# Patient Record
(demographics unavailable — no encounter records)

---

## 2024-10-11 NOTE — DISCUSSION/SUMMARY
[FreeTextEntry1] :  7 yo female here for WCC.   BMI   >95%tile  - Maintain balanced diet with all food groups. Limit sugary beverages & Junk food - Reviewed 5-2-1-0  - Encouraged increase of physical activity  WCC - Developmentally appropriate for age - Continue balanced diet with all food groups. - Brush teeth twice a day with toothbrush. Recommend visit to dentist at least yearly. - Help child to maintain consistent daily routines and sleep schedule.  - School discussed. Ensure home is safe. Teach child about personal safety. Use consistent, positive discipline.  - Limit screen time to no more than 2 hours per day.  - Encourage physical activity.  - Vaccines : Flu...counseled on COVID, MOC to return - Return 1 year for routine well child check.

## 2024-10-11 NOTE — DISCUSSION/SUMMARY
[FreeTextEntry1] :  5 yo female here for WCC.   BMI   >95%tile  - Maintain balanced diet with all food groups. Limit sugary beverages & Junk food - Reviewed 5-2-1-0  - Encouraged increase of physical activity  WCC - Developmentally appropriate for age - Continue balanced diet with all food groups. - Brush teeth twice a day with toothbrush. Recommend visit to dentist at least yearly. - Help child to maintain consistent daily routines and sleep schedule.  - School discussed. Ensure home is safe. Teach child about personal safety. Use consistent, positive discipline.  - Limit screen time to no more than 2 hours per day.  - Encourage physical activity.  - Vaccines : Flu...counseled on COVID, MOC to return - Return 1 year for routine well child check.

## 2024-10-11 NOTE — HISTORY OF PRESENT ILLNESS
[Mother] : mother [Grade ___] : Grade [unfilled] [Normal] : Normal [Brushing teeth] : Brushing teeth [Playtime (60 min/d)] : Playtime 60 min a day [Appropiate parent-child-sibling interaction] : Appropriate parent-child-sibling interaction [Car seat in back seat] : Car seat in back seat [de-identified] : Eating well.  [FreeTextEntry9] : Likes playing basketball. on tablet.  [de-identified] : [id number] - doing well, wants to be doctor.  [FreeTextEntry1] : Abdominal pain has improved. Using ex lax intermittently, helps with BM ~4 hours post medication.   Headaches improved. No longer as frequent.

## 2024-10-11 NOTE — HISTORY OF PRESENT ILLNESS
[Mother] : mother [Grade ___] : Grade [unfilled] [Normal] : Normal [Brushing teeth] : Brushing teeth [Playtime (60 min/d)] : Playtime 60 min a day [Appropiate parent-child-sibling interaction] : Appropriate parent-child-sibling interaction [Car seat in back seat] : Car seat in back seat [de-identified] : Eating well.  [FreeTextEntry9] : Likes playing basketball. on tablet.  [de-identified] : [id number] - doing well, wants to be doctor.  [FreeTextEntry1] : Abdominal pain has improved. Using ex lax intermittently, helps with BM ~4 hours post medication.   Headaches improved. No longer as frequent.

## 2024-12-24 NOTE — HISTORY OF PRESENT ILLNESS
[de-identified] : Hospital follow-up for ear infection [FreeTextEntry6] : 6 yr old F presenting for ear re-check. Diagnosed with L AOM on 12/14. First AOM.  She has been off of antibiotics for 2 days. No new fevers, no ear pain. Good appetite.

## 2024-12-24 NOTE — PHYSICAL EXAM
[Clear] : right tympanic membrane clear [NL] : warm, clear [Tired appearing] : not tired appearing [Lethargic] : not lethargic [Erythema] : no erythema [Bulging] : not bulging [Clear Effusion] : no clear effusion [Wheezing] : no wheezing [Crackles] : no crackles [Transmitted Upper Airway Sounds] : no transmitted upper airway sounds [Tachypnea] : no tachypnea [Rhonchi] : no rhonchi [Belly Breathing] : no belly breathing

## 2024-12-24 NOTE — HISTORY OF PRESENT ILLNESS
[de-identified] : Hospital follow-up for ear infection [FreeTextEntry6] : 6 yr old F presenting for ear re-check. Diagnosed with L AOM on 12/14. First AOM.  She has been off of antibiotics for 2 days. No new fevers, no ear pain. Good appetite.

## 2024-12-24 NOTE — DISCUSSION/SUMMARY
[FreeTextEntry1] : 6 yr old F with resolving L AOM. Asymptomatic.  Plan: - Encourage PO - Return as needed if symptoms return

## 2025-01-06 NOTE — HISTORY OF PRESENT ILLNESS
[No Personal or Family History of Easy Bruising, Bleeding, or Issues with General Anesthesia] : No Personal or Family History of easy bruising, bleeding, or issues with general anesthesia [No change in the review of systems as noted in prior visit date ___] : No change in the review of systems as noted in prior visit date of [unfilled] [de-identified] : 5 y/o F presents for b/l clogged ears and ear infection with chronic nasal congestion Using nasal steroid spray PRN for six months  1 recent ear infections No otalgia No otorrhea No hearing loss No speech concerns Nasal congestion Using flonase PRN with relief Snoring no pausing gasping or choking No recent fevers or infections.

## 2025-01-06 NOTE — PHYSICAL EXAM
[Complete] : complete cerumen impaction [Mild] : mild left inferior turbinate hypertrophy [2+] : 2+ [Increased Work of Breathing] : no increased work of breathing with use of accessory muscles and retractions [Normal muscle strength, symmetry and tone of facial, head and neck musculature] : normal muscle strength, symmetry and tone of facial, head and neck musculature [Normal] : no cervical lymphadenopathy

## 2025-01-06 NOTE — REASON FOR VISIT
[Subsequent Evaluation] : a subsequent evaluation for [Ear Infections] : ear infections [Nasal Obstruction] : nasal obstruction [FreeTextEntry2] : cerumen impaction [Mother] : mother

## 2025-03-05 NOTE — PHYSICAL EXAM
[Tired appearing] : tired appearing [Erythematous Oropharynx] : erythematous oropharynx [NL] : warm, clear [Lethargic] : not lethargic [Toxic] : not toxic [FreeTextEntry1] : TIRED, SLEEPING BUT AROUSABLE AND CONVERSANT [FreeTextEntry2] : NO SINUS TENDERNESS [de-identified] : NEGATIVE KERNIG, NEGATIVE BRUDZINSKI

## 2025-03-05 NOTE — PHYSICAL EXAM
[Tired appearing] : tired appearing [Erythematous Oropharynx] : erythematous oropharynx [NL] : warm, clear [Lethargic] : not lethargic [Toxic] : not toxic [FreeTextEntry1] : TIRED, SLEEPING BUT AROUSABLE AND CONVERSANT [FreeTextEntry2] : NO SINUS TENDERNESS [de-identified] : NEGATIVE KERNIG, NEGATIVE BRUDZINSKI

## 2025-04-23 NOTE — ASSESSMENT
[FreeTextEntry1] : Increasing frequency and severity of migraine-like headaches (now at least 2-3 times a week) and associated with prominent nausea and vomiting PLAN Will do brain MRI to rule out intracranial structural/vascular pathology because the recent change in frequency and severity of the headaches Abortive headache Tx: Ibuprofen 300 mg q 8 hrs prn (take with food prevent gastric irritation) Side effects and potential to cause rebound headaches with frequent use (> twice a week) discussed with parent Keep HA diary to document  frequency, identify triggers and response to medication Behavioral measures to avoid headaches (maintaining regular eating and sleeping habits, avoiding known triggers) discussed with parent and patient Can try OTC Magnesium + Riboflavin supplements daily if behavioral modification does not decrease headaches Will see back in 1-2 month to decide on need for prophylaxis and assess effectiveness of abortive Tx Refer to ophthalmology

## 2025-04-23 NOTE — HISTORY OF PRESENT ILLNESS
[FreeTextEntry1] : 6 year old F with migraine headaches. Last visit in 2022 Over the past few months, Graciela has been having increasing headaches. Recently, headaches have been happening several times a week with school alerting mom that she has to rest and put her head down.  Sent home from school a few times when headaches asso with vomiting - frontal, sharp, lasting several hours, associated with photophobia and phonophobia, + nausea +/-vomiting - relieved by rest and Motrin 300 mg Denies skipping meals Gets at least 9-10 hours of sleep a night

## 2025-04-23 NOTE — PHYSICAL EXAM
[Well-appearing] : well-appearing [Normocephalic] : normocephalic [No dysmorphic facial features] : no dysmorphic facial features [No ocular abnormalities] : no ocular abnormalities [Neck supple] : neck supple [Alert] : alert [Well related, good eye contact] : well related, good eye contact [Conversant] : conversant [Normal speech and language] : normal speech and language [Follows instructions well] : follows instructions well [VFF] : VFF [Pupils reactive to light and accommodation] : pupils reactive to light and accommodation [Full extraocular movements] : full extraocular movements [No nystagmus] : no nystagmus [No papilledema] : no papilledema [Normal facial sensation to light touch] : normal facial sensation to light touch [No facial asymmetry or weakness] : no facial asymmetry or weakness [Gross hearing intact] : gross hearing intact [Equal palate elevation] : equal palate elevation [Good shoulder shrug] : good shoulder shrug [Normal tongue movement] : normal tongue movement [R handed] : R handed [Normal axial and appendicular muscle tone] : normal axial and appendicular muscle tone [Gets up on table without difficulty] : gets up on table without difficulty [No pronator drift] : no pronator drift [Normal finger tapping and fine finger movements] : normal finger tapping and fine finger movements [No abnormal involuntary movements] : no abnormal involuntary movements [5/5 strength in proximal and distal muscles of arms and legs] : 5/5 strength in proximal and distal muscles of arms and legs [Walks and runs well] : walks and runs well [Able to do deep knee bend] : able to do deep knee bend [Able to walk on heels] : able to walk on heels [Able to walk on toes] : able to walk on toes [2+ biceps] : 2+ biceps [Triceps] : triceps [Knee jerks] : knee jerks [Ankle jerks] : ankle jerks [No ankle clonus] : no ankle clonus [Bilaterally] : bilaterally [Localizes LT and temperature] : localizes LT and temperature [No dysmetria on FTNT] : no dysmetria on FTNT [Good walking balance] : good walking balance [Normal gait] : normal gait [Able to tandem well] : able to tandem well [Negative Romberg] : negative Romberg

## 2025-05-02 NOTE — HISTORY OF PRESENT ILLNESS
[Preoperative Visit] : for a medical evaluation prior to surgery [Good] : Good [Prior Anesthesia] : Prior anesthesia [Fever] : no fever [Chills] : no chills [Runny Nose] : no runny nose [Headache] : no headache [Sore Throat] : no sore throat [Cough] : no cough [Nausea] : no nausea [Vomiting] : no vomiting [Abdominal Pain] : no abdominal pain [Prev Anesthesia Reaction] : no previous anesthesia reaction [Anesthesia Reaction] : no anesthesia reaction [Clotting Disorder] : no clotting disorder [Bleeding Disorder] : no bleeding disorder [Sudden Death] : no sudden death [FreeTextEntry1] : MRI Head [FreeTextEntry2] : MRI 5/9 [de-identified] : MRI [de-identified] : MRI w/ sedation

## 2025-05-02 NOTE — PHYSICAL EXAM
[General Appearance - Alert] : alert [General Appearance - Well Developed] : interactive [General Appearance - Well-Appearing] : well appearing [Sclera] : the sclera were normal [PERRL With Normal Accommodation] : the pupils were equal in size, round, and reactive to light [EOMI] : ~T the extraocular movements were intact [Outer Ear] : the ears and nose were normal in appearance [Both Tympanic Membranes Were Examined] : both tympanic membranes were normal [Nasal Cavity] : the nasal mucosa was normal [] : no respiratory distress [Respiration, Rhythm And Depth] : normal respiratory rhythm and effort [Auscultation Breath Sounds / Voice Sounds] : clear bilateral breath sounds [Heart Rate And Rhythm] : heart rate and rhythm were normal [Heart Sounds] : normal S1 and S2 [Heart Sounds Gallop] : no gallops [Bowel Sounds] : normal bowel sounds [Abdomen Soft] : soft [Abnormal Walk] : normal gait [Initial Inspection: Infant Active And Alert] : active and alert [Demonstrated Behavior - Infant Nonreactive To Parents] : interactive

## 2025-05-02 NOTE — HISTORY OF PRESENT ILLNESS
[Preoperative Visit] : for a medical evaluation prior to surgery [Good] : Good [Prior Anesthesia] : Prior anesthesia [Fever] : no fever [Chills] : no chills [Runny Nose] : no runny nose [Headache] : no headache [Sore Throat] : no sore throat [Cough] : no cough [Nausea] : no nausea [Vomiting] : no vomiting [Abdominal Pain] : no abdominal pain [Prev Anesthesia Reaction] : no previous anesthesia reaction [Anesthesia Reaction] : no anesthesia reaction [Clotting Disorder] : no clotting disorder [Bleeding Disorder] : no bleeding disorder [Sudden Death] : no sudden death [FreeTextEntry1] : MRI Head [FreeTextEntry2] : MRI 5/9 [de-identified] : MRI [de-identified] : MRI w/ sedation

## 2025-07-03 NOTE — RESULTS/DATA
[TextEntry] : ACC: [id number]     EXAM:  XR FOOT COMP MIN 3 VIEWS BI   ORDERED BY: EVI GODDARD  PROCEDURE DATE:  06/30/2025    INTERPRETATION:  EXAMINATION: XR FOOT COMPLETE 3 VIEWS BILATERAL  CLINICAL INFORMATION: laceration to plantar aspect of left 2nd toe, r/o retained foreign body, r/o FB  TECHNIQUE: 3 views of each foot  dated 6/30/2025 4:45 PM  COMPARISON: None  FINDINGS:  Right foot: There is no acute fracture or dislocation. Joint spaces are maintained. The soft tissues are unremarkable. There is no radiopaque foreign body.  Left foot: There is no acute fracture or dislocation. Joint spaces are maintained. The soft tissues are unremarkable. There is no radiopaque foreign body.  IMPRESSION:  No acute fracture or dislocation. No radiopaque foreign body.  --- End of Report ---      HINA PARISI MD; Attending Radiologist This document has been electronically signed. Jun 30 2025  4:48PM

## 2025-07-03 NOTE — PHYSICAL EXAM
[NL] : moves all extremities x4, warm, well perfused x4 [de-identified] : 6 SUTURES INTACT ON PLANTAR ASPECT OF BASE OF LEFT 2ND TOE, MARGINS WELL APPROXIMATED, CLEAN, DRY, INTACT, NO SIGNS OF INFECTION.  SUPERFICIAL ABRASION ON SOLE OF RIGHT FOOT, SUPERFICIAL LACERATION LEFT LOWER LEG

## 2025-07-03 NOTE — HISTORY OF PRESENT ILLNESS
[de-identified] : LACERATION [FreeTextEntry6] : ED COURSE 6/30:  Principal Discharge Dx Laceration of toe of left foot.  Medical Decision Making: - The following orders were submitted: Imaging Studies - Clinical Summary (MDM): Summarize the clinical encounter MAUREEN Adler: 6y9m Female with no past medical history, no surgical history, up to date on vaccinations, no known allergies presents to the ER with mom for lacerations. Mom reports laceration to L foot/toe and cut to R foot with pain with ambulation after glass pyrex broke and patient stepped on it. Cleaned out at home with peroxide and removed any glass noted. Concerned for retained FB. Denies hitting head or LOC. Vital signs stable, well appearing in NAD, small abrasion to R big toe and superficial scratch to plantar part of R foot, 2cm laceration to web space of L 2nd foot on plantar aspect, neurovascularly intact. No obvious glass noted. XR done to r/o fracture vs retained FB which was negative. Will wash out well and repair laceration to toe. - Follow-up Instructions (will be supplied to the patient only if discharged) Today you were seen in the ER for laceration.  Please see below for a copy of your results from the xray.  You received 6 absorbable sutures today. The sutures will dissolve over the next 10-14 days however, sutures may require removal by pediatrician, ER or urgent care if they do not fully dissolve.  Keep area clean and dry for the first 24-48 hours. After, that you can let soap and water run down it. Do not keep area submerged in water (baths or pool).  Take Motrin every 6-8 hours as needed for pain. Wear surgical shoe to while foot is wrapped.  Laceration in Children  WHAT YOU NEED TO KNOW:  A laceration is an injury to your child's skin and the soft tissue underneath it.

## 2025-07-07 NOTE — CONSULT LETTER
[Dear  ___] : Dear  [unfilled], [Courtesy Letter:] : I had the pleasure of seeing your patient, [unfilled], in my office today. [Please see my note below.] : Please see my note below. [Consult Closing:] : Thank you very much for allowing me to participate in the care of this patient.  If you have any questions, please do not hesitate to contact me. [Sincerely,] : Sincerely, [FreeTextEntry2] : Mirza Roberts [address] [place] [id number]  [phone] [FreeTextEntry3] : Mkuund Canchola MD Chief, Pediatric Otolaryngology [facility] and [facility] Professor of Otolaryngology [employer] at [facility]

## 2025-07-07 NOTE — PROCEDURE
[Flexible Scope  (R)] : Flexible Scope (R) [Flexible Scope  (L)] : Flexible Scope (L) [None] : None [FreeTextEntry1] : CHRONIC RHINITIS [FreeTextEntry2] : CHRONIC RHINITIS [FreeTextEntry3] : PROCEDURE: NASAL ENDOSCOPY  After informed verbal consent is obtained, the fiberoptic nasal endoscope is passed via the right nasal cavity. The osteomeatal complex is clear with no polyposis or purulence. The sphenoethmoidal recess is clear with no polyposis or purulence. The choana is patent.  The fiberoptic nasal endoscope is passed via the left nasal cavity. The osteomeatal complex is clear with no polyposis or purulence. The sphenoethmoidal recess is clear with no polyposis or purulence. The choana is patent.  There is 60% obstruction of the nasopharynx with adenoid tissue.

## 2025-07-07 NOTE — HISTORY OF PRESENT ILLNESS
[No Personal or Family History of Easy Bruising, Bleeding, or Issues with General Anesthesia] : No Personal or Family History of easy bruising, bleeding, or issues with general anesthesia [No change in the review of systems as noted in prior visit date ___] : No change in the review of systems as noted in prior visit date of [unfilled] [de-identified] : 5 y/o F presents for b/l cerumen impaction  No recent ear infections or otorrhea  No otalgia No concerns for hearing  No speech concerns +Chronic nasal congestion  Has used Flonase for a few weeks with interval improvement  No snoring

## 2025-07-07 NOTE — PHYSICAL EXAM
[Complete] : complete cerumen impaction [2+] : 2+ [Normal muscle strength, symmetry and tone of facial, head and neck musculature] : normal muscle strength, symmetry and tone of facial, head and neck musculature [Normal] : no cervical lymphadenopathy [Exposed Vessel] : left anterior vessel not exposed [Mild] : mild left inferior turbinate hypertrophy [Increased Work of Breathing] : no increased work of breathing with use of accessory muscles and retractions

## 2025-07-07 NOTE — REASON FOR VISIT
[Subsequent Evaluation] : a subsequent evaluation for [Mother] : mother [Nasal Obstruction] : nasal obstruction [FreeTextEntry2] : cerumen impaction

## 2025-07-15 NOTE — ASSESSMENT
[FreeTextEntry1] : 6 year old with migraine-like headaches, possibly triggered by school stress since they have decreased in frequency since school ended this summer. She is doing well academically. Brain MRI 5/2025 normal PLAN Abortive headache Tx: Ibuprofen 300 mg q 8 hrs prn (take with food prevent gastric irritation) Side effects and potential to cause rebound headaches with frequent use (> twice a week) discussed with parent Keep HA diary to document  frequency, identify triggers and response to medication Behavioral measures to avoid headaches (maintaining regular eating and sleeping habits, avoiding known triggers) discussed with parent and patient Can continue OTC Magnesium + Riboflavin supplements  Will see back after school restarts to assess need for prophylaxis

## 2025-07-15 NOTE — HISTORY OF PRESENT ILLNESS
[FreeTextEntry1] : 6 year old F with migraine headaches. Last visit in April 2025 On her last visit we started magnesium for increasing headaches Her headaches have decreased since the summer started (once a week) Patient reports that headaches are triggered by stress of now knowing the answers in school, though she does very well academically Brain MRI May 2025: normal - relieved by rest and Motrin 300 mg

## 2025-07-23 NOTE — DISCUSSION/SUMMARY
[FreeTextEntry1] : SUTURES VERY FRIABLE AND ONLY HOLDING ONTO DRY SKIN. EASILY RUB OFF WITH FINGER, CHILD NOT COOPERATIVE ENOUGHT TO ALLOW SUTURE REMOVAL WITH INSTRUMENTS. WOUND WELL HEALED. RECOMMEND WARM SOAKS AND GENTLE SKIN MASSAGE TO REMOVE DRY SKIN WITH SUTURES.

## 2025-07-23 NOTE — PHYSICAL EXAM
[NL] : moves all extremities x4, warm, well perfused x4 [Capillary Refill <2s] : capillary refill < 2s [de-identified] : TOE MOBILE, NEUROVASCULAR INTACT [de-identified] : HEALED LACERATION VENTRAL LEFT 2ND TOE IN WEB SPACE, THICKENED DRY SKIN WITH FEW FRIABLE SUTURES. NO INFECTION

## 2025-07-23 NOTE — HISTORY OF PRESENT ILLNESS
[de-identified] : TOE SWOLLEN SUTURES DID NOT DISSOLVE. CHILD STEPPED ON GLASS 3 WEEKS AGO AND LACERATED LEFT 2ND TOE, WAS SUTURED IN ED WITH 6 ABSORBABLE SUTURES. SUTURES ARE STILL THERE, CHILD NOT BENDING TOE